# Patient Record
Sex: MALE | Race: WHITE | NOT HISPANIC OR LATINO | Employment: FULL TIME | URBAN - METROPOLITAN AREA
[De-identification: names, ages, dates, MRNs, and addresses within clinical notes are randomized per-mention and may not be internally consistent; named-entity substitution may affect disease eponyms.]

---
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
## 2024-03-07 RX ORDER — FAMOTIDINE 20 MG/1
20 TABLET, FILM COATED ORAL 2 TIMES DAILY PRN
COMMUNITY

## 2024-03-07 RX ORDER — TRIAMCINOLONE ACETONIDE 1 MG/G
CREAM TOPICAL 2 TIMES DAILY PRN
COMMUNITY

## 2024-03-07 RX ORDER — BUSPIRONE HYDROCHLORIDE 5 MG/1
5 TABLET ORAL DAILY
COMMUNITY

## 2024-03-07 RX ORDER — FLUOCINONIDE TOPICAL SOLUTION USP, 0.05% 0.5 MG/ML
SOLUTION TOPICAL 2 TIMES DAILY PRN
COMMUNITY

## 2024-03-07 RX ORDER — DESONIDE 0.5 MG/G
CREAM TOPICAL 2 TIMES DAILY PRN
COMMUNITY

## 2024-03-07 NOTE — PROGRESS NOTES
PTA medications updated by Medication Scribe prior to surgery via phone call with patient (last doses completed by Nurse)     Medication history sources: Patient and H&P  In the past week, patient estimated taking medication this percent of the time: Greater than 90%      Significant changes made to the medication list:  None      Additional medication history information:   None    Medication reconciliation completed by provider prior to medication history? No    Time spent in this activity: 30 minutes    The information provided in this note is only as accurate as the sources available at the time of update(s)      Prior to Admission medications    Medication Sig Last Dose Taking? Auth Provider Long Term End Date   busPIRone (BUSPAR) 5 MG tablet Take 5 mg by mouth daily as needed 03/05/2024 at am Yes Reported, Patient Yes    desonide (DESOWEN) 0.05 % external cream Apply topically 2 times daily as needed More than a month Yes Reported, Patient     famotidine (PEPCID) 20 MG tablet Take 20 mg by mouth 2 times daily as needed 03/05/2024 at am Yes Reported, Patient     fluocinonide (LIDEX) 0.05 % external solution Apply topically 2 times daily as needed More than a month Yes Reported, Patient     sodium chloride (OCEAN) 0.65 % nasal spray Spray 1 spray into both nostrils daily as needed for congestion prn Yes Reported, Patient     triamcinolone (KENALOG) 0.1 % external cream Apply topically 2 times daily as needed for irritation   Reported, Patient         Medication history completed by: Caridad Monsalve

## 2024-03-08 ENCOUNTER — HOSPITAL ENCOUNTER (INPATIENT)
Facility: CLINIC | Age: 24
LOS: 1 days | Discharge: HOME | DRG: 142 | End: 2024-03-09
Attending: DENTIST | Admitting: DENTIST
Payer: COMMERCIAL

## 2024-03-08 ENCOUNTER — ANESTHESIA EVENT (OUTPATIENT)
Dept: SURGERY | Facility: CLINIC | Age: 24
DRG: 142 | End: 2024-03-08
Payer: COMMERCIAL

## 2024-03-08 ENCOUNTER — ANESTHESIA (OUTPATIENT)
Dept: SURGERY | Facility: CLINIC | Age: 24
DRG: 142 | End: 2024-03-08
Payer: COMMERCIAL

## 2024-03-08 PROBLEM — M26.04 MANDIBULAR HYPOPLASIA: Status: ACTIVE | Noted: 2024-03-08

## 2024-03-08 PROCEDURE — 250N000009 HC RX 250: Performed by: NURSE ANESTHETIST, CERTIFIED REGISTERED

## 2024-03-08 PROCEDURE — 258N000003 HC RX IP 258 OP 636: Performed by: ANESTHESIOLOGY

## 2024-03-08 PROCEDURE — 250N000011 HC RX IP 250 OP 636: Performed by: NURSE ANESTHETIST, CERTIFIED REGISTERED

## 2024-03-08 PROCEDURE — 258N000003 HC RX IP 258 OP 636: Performed by: NURSE ANESTHETIST, CERTIFIED REGISTERED

## 2024-03-08 PROCEDURE — 272N000001 HC OR GENERAL SUPPLY STERILE: Performed by: DENTIST

## 2024-03-08 PROCEDURE — 250N000011 HC RX IP 250 OP 636: Performed by: DENTIST

## 2024-03-08 PROCEDURE — 258N000003 HC RX IP 258 OP 636: Performed by: DENTIST

## 2024-03-08 PROCEDURE — 250N000013 HC RX MED GY IP 250 OP 250 PS 637: Performed by: DENTIST

## 2024-03-08 PROCEDURE — 250N000025 HC SEVOFLURANE, PER MIN: Performed by: DENTIST

## 2024-03-08 PROCEDURE — 999N000141 HC STATISTIC PRE-PROCEDURE NURSING ASSESSMENT: Performed by: DENTIST

## 2024-03-08 PROCEDURE — 710N000009 HC RECOVERY PHASE 1, LEVEL 1, PER MIN: Performed by: DENTIST

## 2024-03-08 PROCEDURE — 0NST0ZZ REPOSITION RIGHT MANDIBLE, OPEN APPROACH: ICD-10-PCS | Performed by: DENTIST

## 2024-03-08 PROCEDURE — 21195 RECONST LWR JAW W/O FIXATION: CPT | Performed by: NURSE ANESTHETIST, CERTIFIED REGISTERED

## 2024-03-08 PROCEDURE — 0NSV04Z REPOSITION LEFT MANDIBLE WITH INTERNAL FIXATION DEVICE, OPEN APPROACH: ICD-10-PCS | Performed by: DENTIST

## 2024-03-08 PROCEDURE — 360N000077 HC SURGERY LEVEL 4, PER MIN: Performed by: DENTIST

## 2024-03-08 PROCEDURE — 21195 RECONST LWR JAW W/O FIXATION: CPT | Performed by: ANESTHESIOLOGY

## 2024-03-08 PROCEDURE — 120N000001 HC R&B MED SURG/OB

## 2024-03-08 PROCEDURE — 370N000017 HC ANESTHESIA TECHNICAL FEE, PER MIN: Performed by: DENTIST

## 2024-03-08 PROCEDURE — 272N000002 HC OR SUPPLY OTHER OPNP: Performed by: DENTIST

## 2024-03-08 PROCEDURE — 250N000009 HC RX 250: Performed by: DENTIST

## 2024-03-08 PROCEDURE — C1713 ANCHOR/SCREW BN/BN,TIS/BN: HCPCS | Performed by: DENTIST

## 2024-03-08 DEVICE — WIRE SURGICAL STEEL 24GA 2 DS-24: Type: IMPLANTABLE DEVICE | Site: MANDIBLE | Status: FUNCTIONAL

## 2024-03-08 DEVICE — WIRE SURGICAL STEEL 26GA 0 DS-26: Type: IMPLANTABLE DEVICE | Site: MANDIBLE | Status: FUNCTIONAL

## 2024-03-08 DEVICE — IMP SCR STRK 2.0X12MM 5020412: Type: IMPLANTABLE DEVICE | Site: MANDIBLE | Status: FUNCTIONAL

## 2024-03-08 DEVICE — IMPLANTABLE DEVICE: Type: IMPLANTABLE DEVICE | Site: MANDIBLE | Status: FUNCTIONAL

## 2024-03-08 DEVICE — IMP PLATE STRK STR 04H BAR LOCKING 06MM 5510564: Type: IMPLANTABLE DEVICE | Site: MANDIBLE | Status: FUNCTIONAL

## 2024-03-08 DEVICE — IMP SCR STRK 2.0X04MM 5020404: Type: IMPLANTABLE DEVICE | Site: MANDIBLE | Status: FUNCTIONAL

## 2024-03-08 RX ORDER — SODIUM CHLORIDE, SODIUM LACTATE, POTASSIUM CHLORIDE, CALCIUM CHLORIDE 600; 310; 30; 20 MG/100ML; MG/100ML; MG/100ML; MG/100ML
INJECTION, SOLUTION INTRAVENOUS CONTINUOUS
Status: DISCONTINUED | OUTPATIENT
Start: 2024-03-08 | End: 2024-03-08 | Stop reason: HOSPADM

## 2024-03-08 RX ORDER — ONDANSETRON 4 MG/1
4 TABLET, ORALLY DISINTEGRATING ORAL EVERY 30 MIN PRN
Status: DISCONTINUED | OUTPATIENT
Start: 2024-03-08 | End: 2024-03-08 | Stop reason: HOSPADM

## 2024-03-08 RX ORDER — LIDOCAINE HYDROCHLORIDE 20 MG/ML
INJECTION, SOLUTION INFILTRATION; PERINEURAL PRN
Status: DISCONTINUED | OUTPATIENT
Start: 2024-03-08 | End: 2024-03-08

## 2024-03-08 RX ORDER — MEPERIDINE HYDROCHLORIDE 25 MG/ML
12.5 INJECTION INTRAMUSCULAR; INTRAVENOUS; SUBCUTANEOUS EVERY 5 MIN PRN
Status: DISCONTINUED | OUTPATIENT
Start: 2024-03-08 | End: 2024-03-08 | Stop reason: HOSPADM

## 2024-03-08 RX ORDER — ACETAMINOPHEN 325 MG/1
975 TABLET ORAL EVERY 8 HOURS
Qty: 27 TABLET | Refills: 0 | Status: DISCONTINUED | OUTPATIENT
Start: 2024-03-08 | End: 2024-03-09 | Stop reason: HOSPADM

## 2024-03-08 RX ORDER — DEXAMETHASONE SODIUM PHOSPHATE 10 MG/ML
6 INJECTION, SOLUTION INTRAMUSCULAR; INTRAVENOUS EVERY 6 HOURS
Qty: 3 ML | Refills: 0 | Status: COMPLETED | OUTPATIENT
Start: 2024-03-08 | End: 2024-03-09

## 2024-03-08 RX ORDER — BENZOCAINE/MENTHOL 6 MG-10 MG
LOZENGE MUCOUS MEMBRANE PRN
Status: DISCONTINUED | OUTPATIENT
Start: 2024-03-08 | End: 2024-03-08 | Stop reason: HOSPADM

## 2024-03-08 RX ORDER — DEXMEDETOMIDINE HYDROCHLORIDE 4 UG/ML
INJECTION, SOLUTION INTRAVENOUS CONTINUOUS PRN
Status: DISCONTINUED | OUTPATIENT
Start: 2024-03-08 | End: 2024-03-08

## 2024-03-08 RX ORDER — FENTANYL CITRATE 50 UG/ML
25 INJECTION, SOLUTION INTRAMUSCULAR; INTRAVENOUS EVERY 5 MIN PRN
Status: DISCONTINUED | OUTPATIENT
Start: 2024-03-08 | End: 2024-03-08 | Stop reason: HOSPADM

## 2024-03-08 RX ORDER — PROPOFOL 10 MG/ML
INJECTION, EMULSION INTRAVENOUS PRN
Status: DISCONTINUED | OUTPATIENT
Start: 2024-03-08 | End: 2024-03-08

## 2024-03-08 RX ORDER — OXYCODONE HYDROCHLORIDE 5 MG/1
10 TABLET ORAL EVERY 4 HOURS PRN
Status: DISCONTINUED | OUTPATIENT
Start: 2024-03-08 | End: 2024-03-09 | Stop reason: HOSPADM

## 2024-03-08 RX ORDER — AMPICILLIN AND SULBACTAM 1; .5 G/1; G/1
1.5 INJECTION, POWDER, FOR SOLUTION INTRAMUSCULAR; INTRAVENOUS EVERY 6 HOURS
Status: DISCONTINUED | OUTPATIENT
Start: 2024-03-08 | End: 2024-03-09 | Stop reason: HOSPADM

## 2024-03-08 RX ORDER — SODIUM CHLORIDE 9 MG/ML
INJECTION, SOLUTION INTRAVENOUS CONTINUOUS
Status: DISCONTINUED | OUTPATIENT
Start: 2024-03-08 | End: 2024-03-09 | Stop reason: HOSPADM

## 2024-03-08 RX ORDER — ONDANSETRON 2 MG/ML
INJECTION INTRAMUSCULAR; INTRAVENOUS PRN
Status: DISCONTINUED | OUTPATIENT
Start: 2024-03-08 | End: 2024-03-08

## 2024-03-08 RX ORDER — HYDROMORPHONE HCL IN WATER/PF 6 MG/30 ML
0.2 PATIENT CONTROLLED ANALGESIA SYRINGE INTRAVENOUS EVERY 4 HOURS PRN
Status: DISCONTINUED | OUTPATIENT
Start: 2024-03-08 | End: 2024-03-09 | Stop reason: HOSPADM

## 2024-03-08 RX ORDER — PETROLATUM,WHITE
OINTMENT IN PACKET (GRAM) TOPICAL
Status: DISCONTINUED | OUTPATIENT
Start: 2024-03-08 | End: 2024-03-09 | Stop reason: HOSPADM

## 2024-03-08 RX ORDER — BISACODYL 10 MG
10 SUPPOSITORY, RECTAL RECTAL DAILY PRN
Status: DISCONTINUED | OUTPATIENT
Start: 2024-03-11 | End: 2024-03-09 | Stop reason: HOSPADM

## 2024-03-08 RX ORDER — AMOXICILLIN 250 MG
1 CAPSULE ORAL 2 TIMES DAILY
Status: DISCONTINUED | OUTPATIENT
Start: 2024-03-08 | End: 2024-03-09 | Stop reason: HOSPADM

## 2024-03-08 RX ORDER — HYDROMORPHONE HCL IN WATER/PF 6 MG/30 ML
0.4 PATIENT CONTROLLED ANALGESIA SYRINGE INTRAVENOUS
Status: DISCONTINUED | OUTPATIENT
Start: 2024-03-08 | End: 2024-03-09 | Stop reason: HOSPADM

## 2024-03-08 RX ORDER — LIDOCAINE 40 MG/G
CREAM TOPICAL
Status: DISCONTINUED | OUTPATIENT
Start: 2024-03-08 | End: 2024-03-09 | Stop reason: HOSPADM

## 2024-03-08 RX ORDER — HYDROMORPHONE HYDROCHLORIDE 1 MG/ML
INJECTION, SOLUTION INTRAMUSCULAR; INTRAVENOUS; SUBCUTANEOUS PRN
Status: DISCONTINUED | OUTPATIENT
Start: 2024-03-08 | End: 2024-03-08

## 2024-03-08 RX ORDER — HYDROMORPHONE HCL IN WATER/PF 6 MG/30 ML
0.2 PATIENT CONTROLLED ANALGESIA SYRINGE INTRAVENOUS EVERY 5 MIN PRN
Status: DISCONTINUED | OUTPATIENT
Start: 2024-03-08 | End: 2024-03-08 | Stop reason: HOSPADM

## 2024-03-08 RX ORDER — FENTANYL CITRATE 50 UG/ML
INJECTION, SOLUTION INTRAMUSCULAR; INTRAVENOUS PRN
Status: DISCONTINUED | OUTPATIENT
Start: 2024-03-08 | End: 2024-03-08

## 2024-03-08 RX ORDER — POLYETHYLENE GLYCOL 3350 17 G/17G
17 POWDER, FOR SOLUTION ORAL DAILY
Status: DISCONTINUED | OUTPATIENT
Start: 2024-03-09 | End: 2024-03-09 | Stop reason: HOSPADM

## 2024-03-08 RX ORDER — ONDANSETRON 2 MG/ML
4 INJECTION INTRAMUSCULAR; INTRAVENOUS EVERY 6 HOURS PRN
Status: DISCONTINUED | OUTPATIENT
Start: 2024-03-08 | End: 2024-03-09 | Stop reason: HOSPADM

## 2024-03-08 RX ORDER — ACETAMINOPHEN 325 MG/1
650 TABLET ORAL EVERY 4 HOURS PRN
Status: DISCONTINUED | OUTPATIENT
Start: 2024-03-11 | End: 2024-03-09 | Stop reason: HOSPADM

## 2024-03-08 RX ORDER — VECURONIUM BROMIDE 1 MG/ML
INJECTION, POWDER, LYOPHILIZED, FOR SOLUTION INTRAVENOUS PRN
Status: DISCONTINUED | OUTPATIENT
Start: 2024-03-08 | End: 2024-03-08

## 2024-03-08 RX ORDER — ONDANSETRON 4 MG/1
4 TABLET, ORALLY DISINTEGRATING ORAL EVERY 6 HOURS PRN
Status: DISCONTINUED | OUTPATIENT
Start: 2024-03-08 | End: 2024-03-09 | Stop reason: HOSPADM

## 2024-03-08 RX ORDER — NALOXONE HYDROCHLORIDE 0.4 MG/ML
0.4 INJECTION, SOLUTION INTRAMUSCULAR; INTRAVENOUS; SUBCUTANEOUS
Status: DISCONTINUED | OUTPATIENT
Start: 2024-03-08 | End: 2024-03-09 | Stop reason: HOSPADM

## 2024-03-08 RX ORDER — PROPOFOL 10 MG/ML
INJECTION, EMULSION INTRAVENOUS CONTINUOUS PRN
Status: DISCONTINUED | OUTPATIENT
Start: 2024-03-08 | End: 2024-03-08

## 2024-03-08 RX ORDER — CEFAZOLIN SODIUM/WATER 2 G/20 ML
2 SYRINGE (ML) INTRAVENOUS SEE ADMIN INSTRUCTIONS
Status: DISCONTINUED | OUTPATIENT
Start: 2024-03-08 | End: 2024-03-08 | Stop reason: HOSPADM

## 2024-03-08 RX ORDER — NALOXONE HYDROCHLORIDE 0.4 MG/ML
0.2 INJECTION, SOLUTION INTRAMUSCULAR; INTRAVENOUS; SUBCUTANEOUS
Status: DISCONTINUED | OUTPATIENT
Start: 2024-03-08 | End: 2024-03-09 | Stop reason: HOSPADM

## 2024-03-08 RX ORDER — CHLORHEXIDINE GLUCONATE ORAL RINSE 1.2 MG/ML
10 SOLUTION DENTAL ONCE
Status: COMPLETED | OUTPATIENT
Start: 2024-03-08 | End: 2024-03-08

## 2024-03-08 RX ORDER — FENTANYL CITRATE 50 UG/ML
50 INJECTION, SOLUTION INTRAMUSCULAR; INTRAVENOUS EVERY 5 MIN PRN
Status: DISCONTINUED | OUTPATIENT
Start: 2024-03-08 | End: 2024-03-08 | Stop reason: HOSPADM

## 2024-03-08 RX ORDER — BENZOCAINE/MENTHOL 6 MG-10 MG
LOZENGE MUCOUS MEMBRANE 3 TIMES DAILY
Status: DISCONTINUED | OUTPATIENT
Start: 2024-03-08 | End: 2024-03-09 | Stop reason: HOSPADM

## 2024-03-08 RX ORDER — NALOXONE HYDROCHLORIDE 0.4 MG/ML
0.1 INJECTION, SOLUTION INTRAMUSCULAR; INTRAVENOUS; SUBCUTANEOUS
Status: DISCONTINUED | OUTPATIENT
Start: 2024-03-08 | End: 2024-03-08 | Stop reason: HOSPADM

## 2024-03-08 RX ORDER — PROCHLORPERAZINE MALEATE 10 MG
10 TABLET ORAL EVERY 6 HOURS PRN
Status: DISCONTINUED | OUTPATIENT
Start: 2024-03-08 | End: 2024-03-09 | Stop reason: HOSPADM

## 2024-03-08 RX ORDER — METHYLPREDNISOLONE SODIUM SUCCINATE 125 MG/2ML
125 INJECTION, POWDER, LYOPHILIZED, FOR SOLUTION INTRAMUSCULAR; INTRAVENOUS ONCE
Status: COMPLETED | OUTPATIENT
Start: 2024-03-08 | End: 2024-03-08

## 2024-03-08 RX ORDER — OXYCODONE HYDROCHLORIDE 5 MG/1
5 TABLET ORAL EVERY 4 HOURS PRN
Status: DISCONTINUED | OUTPATIENT
Start: 2024-03-08 | End: 2024-03-09 | Stop reason: HOSPADM

## 2024-03-08 RX ORDER — ONDANSETRON 2 MG/ML
4 INJECTION INTRAMUSCULAR; INTRAVENOUS EVERY 30 MIN PRN
Status: DISCONTINUED | OUTPATIENT
Start: 2024-03-08 | End: 2024-03-08 | Stop reason: HOSPADM

## 2024-03-08 RX ORDER — HYDROMORPHONE HCL IN WATER/PF 6 MG/30 ML
0.4 PATIENT CONTROLLED ANALGESIA SYRINGE INTRAVENOUS EVERY 5 MIN PRN
Status: DISCONTINUED | OUTPATIENT
Start: 2024-03-08 | End: 2024-03-08 | Stop reason: HOSPADM

## 2024-03-08 RX ORDER — CEFAZOLIN SODIUM/WATER 2 G/20 ML
2 SYRINGE (ML) INTRAVENOUS
Status: COMPLETED | OUTPATIENT
Start: 2024-03-08 | End: 2024-03-08

## 2024-03-08 RX ADMIN — VECURONIUM BROMIDE 5 MG: 1 INJECTION, POWDER, LYOPHILIZED, FOR SOLUTION INTRAVENOUS at 12:36

## 2024-03-08 RX ADMIN — PHENYLEPHRINE HYDROCHLORIDE 100 MCG: 10 INJECTION INTRAVENOUS at 14:52

## 2024-03-08 RX ADMIN — AMPICILLIN SODIUM AND SULBACTAM SODIUM 1.5 G: 1; .5 INJECTION, POWDER, FOR SOLUTION INTRAMUSCULAR; INTRAVENOUS at 22:24

## 2024-03-08 RX ADMIN — ONDANSETRON 4 MG: 2 INJECTION INTRAMUSCULAR; INTRAVENOUS at 14:35

## 2024-03-08 RX ADMIN — PHENYLEPHRINE HYDROCHLORIDE 100 MCG: 10 INJECTION INTRAVENOUS at 14:33

## 2024-03-08 RX ADMIN — PHENYLEPHRINE HYDROCHLORIDE 50 MCG: 10 INJECTION INTRAVENOUS at 14:19

## 2024-03-08 RX ADMIN — ROCURONIUM BROMIDE 30 MG: 50 INJECTION, SOLUTION INTRAVENOUS at 12:30

## 2024-03-08 RX ADMIN — HYDROMORPHONE HYDROCHLORIDE 0.4 MG: 0.2 INJECTION, SOLUTION INTRAMUSCULAR; INTRAVENOUS; SUBCUTANEOUS at 18:22

## 2024-03-08 RX ADMIN — PROPOFOL 200 MG: 10 INJECTION, EMULSION INTRAVENOUS at 12:24

## 2024-03-08 RX ADMIN — HYDROMORPHONE HYDROCHLORIDE 0.2 MG: 0.2 INJECTION, SOLUTION INTRAMUSCULAR; INTRAVENOUS; SUBCUTANEOUS at 20:39

## 2024-03-08 RX ADMIN — OXYCODONE HYDROCHLORIDE 10 MG: 5 TABLET ORAL at 18:25

## 2024-03-08 RX ADMIN — DEXMEDETOMIDINE HYDROCHLORIDE 0.2 MCG/KG/HR: 200 INJECTION INTRAVENOUS at 12:59

## 2024-03-08 RX ADMIN — SODIUM CHLORIDE 1000 ML: 9 INJECTION, SOLUTION INTRAVENOUS at 16:30

## 2024-03-08 RX ADMIN — PROPOFOL 150 MCG/KG/MIN: 10 INJECTION, EMULSION INTRAVENOUS at 12:26

## 2024-03-08 RX ADMIN — ONDANSETRON 4 MG: 2 INJECTION INTRAMUSCULAR; INTRAVENOUS at 21:57

## 2024-03-08 RX ADMIN — SODIUM CHLORIDE, POTASSIUM CHLORIDE, SODIUM LACTATE AND CALCIUM CHLORIDE: 600; 310; 30; 20 INJECTION, SOLUTION INTRAVENOUS at 13:48

## 2024-03-08 RX ADMIN — SUGAMMADEX 200 MG: 100 INJECTION, SOLUTION INTRAVENOUS at 14:52

## 2024-03-08 RX ADMIN — CHLORHEXIDINE GLUCONATE 0.12% ORAL RINSE 10 ML: 1.2 LIQUID ORAL at 10:41

## 2024-03-08 RX ADMIN — FENTANYL CITRATE 100 MCG: 50 INJECTION INTRAMUSCULAR; INTRAVENOUS at 12:17

## 2024-03-08 RX ADMIN — ACETAMINOPHEN 975 MG: 325 TABLET, FILM COATED ORAL at 21:56

## 2024-03-08 RX ADMIN — HYDROMORPHONE HYDROCHLORIDE 0.5 MG: 1 INJECTION, SOLUTION INTRAMUSCULAR; INTRAVENOUS; SUBCUTANEOUS at 12:55

## 2024-03-08 RX ADMIN — AMPICILLIN SODIUM AND SULBACTAM SODIUM 1.5 G: 1; .5 INJECTION, POWDER, FOR SOLUTION INTRAMUSCULAR; INTRAVENOUS at 19:51

## 2024-03-08 RX ADMIN — Medication 2 G: at 12:28

## 2024-03-08 RX ADMIN — DEXAMETHASONE SODIUM PHOSPHATE 6 MG: 10 INJECTION INTRAMUSCULAR; INTRAVENOUS at 20:07

## 2024-03-08 RX ADMIN — SODIUM CHLORIDE, POTASSIUM CHLORIDE, SODIUM LACTATE AND CALCIUM CHLORIDE: 600; 310; 30; 20 INJECTION, SOLUTION INTRAVENOUS at 10:40

## 2024-03-08 RX ADMIN — DEXMEDETOMIDINE HYDROCHLORIDE 20 MCG: 200 INJECTION INTRAVENOUS at 12:40

## 2024-03-08 RX ADMIN — METHYLPREDNISOLONE SODIUM SUCCINATE 125 MG: 125 INJECTION INTRAMUSCULAR; INTRAVENOUS at 12:24

## 2024-03-08 RX ADMIN — ROCURONIUM BROMIDE 50 MG: 50 INJECTION, SOLUTION INTRAVENOUS at 12:25

## 2024-03-08 RX ADMIN — MIDAZOLAM 2 MG: 1 INJECTION INTRAMUSCULAR; INTRAVENOUS at 12:17

## 2024-03-08 RX ADMIN — LIDOCAINE HYDROCHLORIDE 100 MG: 20 INJECTION, SOLUTION INFILTRATION; PERINEURAL at 12:24

## 2024-03-08 ASSESSMENT — ACTIVITIES OF DAILY LIVING (ADL)
ADLS_ACUITY_SCORE: 23
ADLS_ACUITY_SCORE: 23
ADLS_ACUITY_SCORE: 18
ADLS_ACUITY_SCORE: 27
ADLS_ACUITY_SCORE: 27
ADLS_ACUITY_SCORE: 23
ADLS_ACUITY_SCORE: 18
ADLS_ACUITY_SCORE: 23
ADLS_ACUITY_SCORE: 33
ADLS_ACUITY_SCORE: 18
ADLS_ACUITY_SCORE: 20
ADLS_ACUITY_SCORE: 18
ADLS_ACUITY_SCORE: 18
ADLS_ACUITY_SCORE: 35

## 2024-03-08 NOTE — OP NOTE
Oral and maxillofacial Operative Report:    Pre-Operative Diagnosis: Mandibular Hypoplasia    Post-Operative Diagnosis: same    Procedure Performed: Saggital Split Osteotomy    Surgeons: Sarina/Kolby    EBL: 50cc*    Fluids: crystalloid    Local anesthetic: lidocaine    Drains: none    Specimens: none    Complications: none*    Implants: see op note.      INDICATIONS FOR PROCEDURE:  Patient  is an otherwise healthy 23-year-old male who was referred to our clinic for evaluation and consultation regarding mandibular hypoplasia. The patient has been continued to be followed closely by our service.  The patient was then deemed ready from a surgical standpoint and from a orthodontic standpoint, for the above-mentioned procedure.  After the review of the patient as well as clinical images, radiographs, and presurgical models, the patient was deemed ready for the procedure.  The risks and benefits of the procedure were thoroughly discussed with the patient as well as the patient's parents.  These risks included but not limited to bleeding, bruising, pain, swelling, infection, fibrous union, nonunion, malunion, malocclusion, persistent malocclusion, failure of hardware, infection of hardware, need for removal of hardware, nerve injury to bilateral mandibular inferior alveolar nerve, bilateral lingual nerve resulting in temporary or permanent paresthesia, dysesthesia, or anaesthesia, and need for additional procedures in the future.  After the full risks and benefits of the procedure were discussed, the patient thoroughly understood and the patient consented to the procedure.      DESCRIPTION OF PROCEDURE:  On the day of the procedure, the patient was met in the preoperative holding area and written and verbal consent was obtained.  The preoperative surgical site was marked according to surgical protocol.  The patient was then escorted by the Anesthesia Service to operating room 32, was placed in supine position.  All  standard ASA monitors were applied.  The patient was then appropriately padded.  The patient was then intubated in the left naris without any difficulty.  Confirmation of the tube was confirmed via the Anesthesia Service via auscultation of the lungs, CO2 monitoring, as well as presence of fog in the tube.  The tube was then positioned over the top of the head and fixated so as to not place any pressure on the left naris.  A nasogastric tube was then placed in the right naris with no pressure on the right naris into the stomach to prevent excessive blood accumulation in the stomach.  The patient was then prepped and draped to create a sterile field around the oral cavity.  The oral surgeons then left the operating room and left the patient under the care of the Anesthesia Service to perform a sterile scrub.  The oral surgeons then returned to the operating room to don sterile gowns and gloves.  A preoperative surgical timeout was conducted. Local anesthesia was given via bilateral inferior alveolar nerve blocks, long buccal nerve blocks and local infiltration.  A throat screen was then placed to protect the posterior oropharynx and a bite block was placed.      Attention was first addressed to the left mandibular sagittal split.  A cautery was used to create a vertical sagittal split soft tissue incision on the lateral ascending ramus into the left mandibular vestibule just anterior to tooth #19.  A buccal subperiosteal dissection was completed on the lateral border of the mandible.  Then meticulously the lingual aspect of the mandibular body was exposed subperiosteally until the lingula was identified.  A nerve hook was then used to identify the mandibular foramen and isolate the superior aspect of the inferior alveolar nerve.  After the nerve was identified, a channel retractor was then placed on the lingual aspect subperiosteally over the superior aspect of the inferior alveolar nerve so as to protect it.  A  surgical handpiece, under copious irrigation with a barrel bur, was used to create the initial horizontal lingual horizontal osteotomy approximating the lingula.  Once sufficient bone for initial osteotomy was created, this osteotomy was then extended with a Eamon bur under copious irrigation to finalize the horizontal cut.  Then a sagittal saw was then used to create the superior cut along the superior aspect of the ramus and mandibular body along the lateral of tooth #18 and to the distal aspect of tooth #19 under copious irrigation.  A vertical osteotomy was created on the buccal aspect of tooth #19 with a Mita bur through the cortical bone and through the inferior border of the mandible under copious irrigaiton.  Then, with a combination of a narrow and wide osteotome and mallallen, Gibbs , and Gibbs separator the inferior border of the osteotomy was engaged anteriorly and inferiorly and along the crestal aspect of the osteotomy was engaged and slowly and meticulously the sagittal split osteotomy was completed.  The inferior alveolar nerve was visualized and noted to be intact.  The distal portion of the nerve was embedded in the proximal segment medullary bone.  The overlying bone was then removed and freed up the nerve so as to be within the distal segment freely and passively.    Attention was addressed to the right mandibular sagittal split.  A cautery was used to create a vertical sagittal split soft tissue incision on the lateral ascending ramus into the left mandibular vestibule just anterior to tooth #30.  A buccal subperiosteal dissection was completed on the lateral border of the mandible.  Then meticulously the lingual aspect of the mandibular body was exposed subperiosteally until the lingula was identified.  A nerve hook was then used to identify the mandibular foramen and isolate the superior aspect of the inferior alveolar nerve.  After the nerve was identified, a channel retractor  was then placed on the lingual aspect subperiosteally over the superior aspect of the inferior alveolar nerve so as to protect it.  A surgical handpiece, under copious irrigation with a barrel bur, was used to create the initial horizontal lingual horizontal osteotomy approximating the lingula.  Once sufficient bone for initial osteotomy was created, this osteotomy was then extended with a Eamon bur under copious irrigation to finalize the horizontal cut.  Then a sagittal saw was then used to create the superior cut along the superior aspect of the ramus and mandibular body along the lateral of tooth #31 and to the distal aspect of tooth #30 under copious irrigation.  A vertical osteotomy was created on the buccal aspect of tooth #30 with a Mita bur through the cortical bone and through the inferior border of the mandible under copious irrigaiton.  Then, with a combination of a narrow and wide osteotome and mallallen, Gibbs , and Gibbs separator the inferior border of the osteotomy was engaged anteriorly and inferiorly and along the crestal aspect of the osteotomy was engaged and slowly and meticulously the sagittal split osteotomy was completed.  The inferior alveolar nerve was visualized and noted to be intact.  The distal portion of the nerve was embedded in the proximal segment medullary bone.  The overlying bone was then removed however a small portion of the nerve remained heavily within the proximal segment of medullary bone. Given the rightward rotation of the mandibular, rather than risk damaging the nerve by removing the overlying bone, the nerve was left in place in the proximal segment.  The nerve did appear impinged or stretched.    Both the right and the left segments were thoroughly irrigated and evaluated.  No sharp bony undercuts were noted.  The occlusal splint was then inserted and noted to have the maxillary and mandibular teeth fully interdigitate passively into the predetermined  presurgical occlusion.  The patient was then placed into maxillomandibular wire fixation with the interocclusal splint in place.  Attention was then addressed to fixating the osteotomized segments.  A 4-hole yordan fixation plate was then adapted over the superior lateral border of the left mandibular segments and the 4 holes were then initially created under copious irrigation and a combination of 4 mm screws were placed.  The plate was noted to be rigidly fixated with all four screws fully engaged within the bone without any stripping.  Next, a 4-hole yordan fixation plate was then adapted over the superior lateral border of the right mandibular segments and the 4 holes were then initially created under copious irrigation and then 4 mm screws were placed.  The plate was noted to be rigidly fixated with all four screws fully engaged within the bone without any stripping.   Next a round bur was used and a drill to create two single individual osteotomies for single 12 mm lag screws bilaterally. Two individual 12 mm screws were then seating with lag screw technique and fully tightened without any stripping of the screws. Both segments were noted to be well adapted with the superior segments and well aligned.  The condylar position appeared to be unchanged.  The wire fixation was then removed.  The mandible movement was noted to be smooth and passive, noting that the patient were to be able to easily interdigitate into the splint without any mobility, sliding, or premature irregular occlusion.  The splint was then removed.  The occlusion was then assessed.  The occlusion was noted to be stable.  Maxillary and mandibular midlines were stable and coincident.  Class I molar occlusion noted. The osteotomized segments were noted to be rigid with no mobility.  The vestibular incisions were then closed with 3-0 Vicryl suture in a combination of a running nonlocking fashion as well as interrupted fashion.  Both vestibular  incisions were noted to be hemostatic.  No persistent bleeding.  The previously placed throat pack was then removed.  The oropharynx was then suctioned.  Again, the occlusion was assessed additionally one final time and noted to be stable.  The procedure was then completed at this time.  No complications.  The patient was then extubated.  All counts were correct x 2.  The patient was then turned over to the Anesthesia Service for a smooth wakeup and transferred to PACU.     Robert Jarrett DDS

## 2024-03-08 NOTE — ANESTHESIA CARE TRANSFER NOTE
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    Anesthesia Type:   General     Note:    Oropharynx: oropharynx clear of all foreign objects and spontaneously breathing  Level of Consciousness: awake      Independent Airway: airway patency satisfactory and stable  Dentition: dentition unchanged  Vital Signs Stable: post-procedure vital signs reviewed and stable  Report to RN Given: handoff report given  Patient transferred to: PACU  Comments: Neuromuscular blockade reversed with sugammadex, spontaneous respirations, adequate tidal volumes, followed commands to voice, oropharynx suctioned with soft flexible catheter, extubated atraumatically, extubated with suction, airway patent after extubation.  Oxygen via facemask at 6 liters per minute to PACU. Oxygen tubing connected to wall O2 in PACU, SpO2, NiBP, and EKG monitors and alarms on and functioning, Dorothea Hugger warmer connected to patient gown, report on patient's clinical status given to PACU RN, RN questions answered.         Handoff Report: Identifed the Patient, Identified the Reponsible Provider, Reviewed the pertinent medical history, Discussed the surgical course, Reviewed Intra-OP anesthesia mangement and issues during anesthesia, Set expectations for post-procedure period and Allowed opportunity for questions and acknowledgement of understanding        Electronically Signed By: TANNA Watts CRNA  March 8, 2024  3:21 PM

## 2024-03-08 NOTE — BRIEF OP NOTE
[facility]    Brief Operative Note    Pre-operative diagnosis: Mandibular hypoplasia [M26.04]  Post-operative diagnosis Same as pre-operative diagnosis    Procedure: Bilateral Sagittal Split Osteotomy, Bilateral - Jaw    Surgeon: Surgeon(s) and Role:     * Feliberto Branch DDS - Primary     * Robert Jarrett DDS  Anesthesia: General   Estimated Blood Loss: 50cc    Drains: None  Specimens: * No specimens in log *  Findings:   Rigid fixation of mandible achieved .  Complications: None.  Implants:   Implant Name Type Inv. Item Serial No.  Lot No. LRB No. Used Action   WIRE SURGICAL STEEL 24GA 2 DS-24 - [id number] Wire WIRE SURGICAL STEEL 24GA 2 DS-24  [employer] 42   11   [id number] Right 1 Used as a Supply   WIRE SURGICAL STEEL 26GA 0 DS-26 - [id number] Wire WIRE SURGICAL STEEL 26GA 0 DS-26  [employer] 42   03   [id number]   [id number] Right 1 Used as a Supply   IMP PLATE STRK STR 04H BAR LOCKING 06MM [id number] - [id number] Metallic Hardware/[place] IMP PLATE STRK STR 04H BAR LOCKING 06MM [id number]  [employer] 42   02 02/29/24 Right 1 Implanted   IMP PLATE STRK STR 06H BAR LOCKING 12MM [id number] - [id number] Metallic Hardware/[place] IMP PLATE STRK STR 06H BAR LOCKING 12MM [id number]  [employer] 42 02 02/29/24 Left 1 Implanted   IMP SCR STRK 2.0X04MM [id number] - [id number] Metallic Hardware/[place] IMP SCR STRK 2.0X04MM [id number]  [employer] 42 02 02/29/24 Left 4 Implanted   IMP SCR STRK 2.0X04MM [id number] - [id number] Metallic Hardware/[place] IMP SCR STRK 2.0X04MM [id number]  [employer] 42 02 02/29/24 Right 4 Implanted   IMP SCR STRK 2.0X12MM [id number] - [id number] Metallic Hardware/[place] IMP SCR STRK 2.0X12MM [id number]  [employer] 42 02 02/29/24 Left 1 Implanted   IMP SCR STRK 2.0X12MM [id number] - [id number] Metallic Hardware/[place] IMP SCR STRK 2.0X12MM [id number]  [employer] 42 02 02/29/24 Right 1 Implanted   IMP SCR STRK 2.0X14MM [id number] -  [id number] Metallic Hardware/[place] IMP SCR STRK 2.0X14MM [id number]  [employer] 42 02 02/29/24 Right 1 Wasted       Robert Jarrett DDS

## 2024-03-08 NOTE — ANESTHESIA PREPROCEDURE EVALUATION
"Anesthesia Pre-Procedure Evaluation    Patient: Amrik Whyte   MRN: [id number] : 2000        Procedure : Procedure(s):  Bilateral Sagittal Split Osteotomy          Past Medical History:   Diagnosis Date    Anxiety     Gastroesophageal reflux disease without esophagitis     Hyperlipidemia     Irritable bowel syndrome     Psoriasis     Uncomplicated asthma       History reviewed. No pertinent surgical history.   No Known Allergies   Social History     Tobacco Use    Smoking status: Never    Smokeless tobacco: Never   Substance Use Topics    Alcohol use: Yes     Comment: Drink with friends, about once a week      Wt Readings from Last 1 Encounters:   24 77.6 kg (171 lb)        Anesthesia Evaluation   Pt has had prior anesthetic.     No history of anesthetic complications       ROS/MED HX  ENT/Pulmonary:     (+)                      asthma                  Neurologic:  - neg neurologic ROS     Cardiovascular:  - neg cardiovascular ROS     METS/Exercise Tolerance:     Hematologic:       Musculoskeletal:       GI/Hepatic:     (+) GERD, Asymptomatic on medication,                  Renal/Genitourinary:  - neg Renal ROS     Endo:       Psychiatric/Substance Use:       Infectious Disease:       Malignancy:       Other:            Physical Exam    Airway        Mallampati: II   TM distance: > 3 FB   Neck ROM: full   Mouth opening: > 3 cm    Respiratory Devices and Support         Dental       (+) Completely normal teeth      Cardiovascular          Rhythm and rate: regular and normal     Pulmonary           breath sounds clear to auscultation           OUTSIDE LABS:  CBC: No results found for: \"WBC\", \"HGB\", \"HCT\", \"PLT\"  BMP: No results found for: \"NA\", \"POTASSIUM\", \"CHLORIDE\", \"CO2\", \"BUN\", \"CR\", \"GLC\"  COAGS: No results found for: \"PTT\", \"INR\", \"FIBR\"  POC: No results found for: \"BGM\", \"HCG\", \"HCGS\"  HEPATIC: No results found for: \"ALBUMIN\", \"PROTTOTAL\", \"ALT\", \"AST\", \"GGT\", \"ALKPHOS\", \"BILITOTAL\", " "\"BILIDIRECT\", \"BAYLEE\"  OTHER: No results found for: \"PH\", \"LACT\", \"A1C\", \"JOE\", \"PHOS\", \"MAG\", \"LIPASE\", \"AMYLASE\", \"TSH\", \"T4\", \"T3\", \"CRP\", \"SED\"    Anesthesia Plan    ASA Status:  2    NPO Status:  NPO Appropriate    Anesthesia Type: General.     - Airway: ETT         Techniques and Equipment:     - Airway: Video-Laryngoscope     - Lines/Monitors: 2nd IV     Consents    Anesthesia Plan(s) and associated risks, benefits, and realistic alternatives discussed. Questions answered and patient/representative(s) expressed understanding.     - Discussed: Risks, Benefits and Alternatives for BOTH SEDATION and the PROCEDURE were discussed     - Discussed with:  Patient            Postoperative Care    Pain management: Multi-modal analgesia.        Comments:    Other Comments: Nasal intubation  PIV x 2           Tahira Berrios MD    I have reviewed the pertinent notes and labs in the chart from the past 30 days and (re)examined the patient.  Any updates or changes from those notes are reflected in this note.                  "

## 2024-03-08 NOTE — ANESTHESIA PROCEDURE NOTES
Airway       Patient location during procedure: OR       Procedure Start/Stop Times: 3/8/2024 12:28 PM  Staff -        CRNA: Era Choi APRN CRNA       Performed By: CRNA  Consent for Airway        Urgency: elective  Indications and Patient Condition       Indications for airway management: karen-procedural         Mask difficulty assessment: 1 - vent by mask    Final Airway Details       Final airway type: endotracheal airway       Successful airway: MIKE and Nasal  Endotracheal Airway Details        ETT size (mm): 7.5       Cuffed: yes       Successful intubation technique: video laryngoscopy       VL Blade Size: Santos 3       Grade View of Cords: 1       Adjucts: stylet       Position: Left       Measured from: nares       Bite block used: None    Post intubation assessment        Placement verified by: capnometry, equal breath sounds and chest rise        Number of attempts at approach: 1       Secured with: tape       Ease of procedure: easy       Dentition: Intact and Unchanged    Medication(s) Administered   Medication Administration Time: 3/8/2024 12:28 PM

## 2024-03-08 NOTE — ANESTHESIA POSTPROCEDURE EVALUATION
Patient: Amrik Whyte    Procedure: Procedure(s):  Bilateral Sagittal Split Osteotomy       Anesthesia Type:  General    Note:  Disposition: Admission   Postop Pain Control: Uneventful            Sign Out: Well controlled pain   PONV: No   Neuro/Psych: Uneventful            Sign Out: Acceptable/Baseline neuro status   Airway/Respiratory: Uneventful            Sign Out: Acceptable/Baseline resp. status   CV/Hemodynamics: Uneventful            Sign Out: Acceptable CV status   Other NRE: NONE   DID A NON-ROUTINE EVENT OCCUR? No           Last vitals:  Vitals Value Taken Time   /84 03/08/24 1547   Temp     Pulse 85 03/08/24 1558   Resp 6 03/08/24 1558   SpO2 92 % 03/08/24 1558   Vitals shown include unfiled device data.    Electronically Signed By: Charles Farnsworth MD  March 8, 2024  4:19 PM

## 2024-03-09 VITALS
DIASTOLIC BLOOD PRESSURE: 77 MMHG | OXYGEN SATURATION: 93 % | RESPIRATION RATE: 16 BRPM | SYSTOLIC BLOOD PRESSURE: 138 MMHG | HEART RATE: 88 BPM | TEMPERATURE: 98.1 F | HEIGHT: 70 IN | BODY MASS INDEX: 24.48 KG/M2 | WEIGHT: 171 LBS

## 2024-03-09 LAB — GLUCOSE BLDC GLUCOMTR-MCNC: 135 MG/DL (ref 70–99)

## 2024-03-09 PROCEDURE — 250N000011 HC RX IP 250 OP 636: Performed by: DENTIST

## 2024-03-09 PROCEDURE — 250N000013 HC RX MED GY IP 250 OP 250 PS 637: Performed by: DENTIST

## 2024-03-09 PROCEDURE — 258N000003 HC RX IP 258 OP 636: Performed by: DENTIST

## 2024-03-09 RX ADMIN — HYDROCORTISONE: 10 CREAM TOPICAL at 08:10

## 2024-03-09 RX ADMIN — DEXAMETHASONE SODIUM PHOSPHATE 6 MG: 10 INJECTION INTRAMUSCULAR; INTRAVENOUS at 08:25

## 2024-03-09 RX ADMIN — ACETAMINOPHEN 975 MG: 325 TABLET, FILM COATED ORAL at 14:12

## 2024-03-09 RX ADMIN — HYDROCORTISONE: 10 CREAM TOPICAL at 14:19

## 2024-03-09 RX ADMIN — OXYCODONE HYDROCHLORIDE 10 MG: 5 TABLET ORAL at 11:06

## 2024-03-09 RX ADMIN — SODIUM CHLORIDE: 9 INJECTION, SOLUTION INTRAVENOUS at 08:18

## 2024-03-09 RX ADMIN — OXYCODONE HYDROCHLORIDE 10 MG: 5 TABLET ORAL at 06:37

## 2024-03-09 RX ADMIN — HYDROMORPHONE HYDROCHLORIDE 0.2 MG: 0.2 INJECTION, SOLUTION INTRAMUSCULAR; INTRAVENOUS; SUBCUTANEOUS at 02:13

## 2024-03-09 RX ADMIN — HYDROMORPHONE HYDROCHLORIDE 0.2 MG: 0.2 INJECTION, SOLUTION INTRAMUSCULAR; INTRAVENOUS; SUBCUTANEOUS at 04:53

## 2024-03-09 RX ADMIN — DEXAMETHASONE SODIUM PHOSPHATE 6 MG: 10 INJECTION INTRAMUSCULAR; INTRAVENOUS at 01:56

## 2024-03-09 RX ADMIN — ACETAMINOPHEN 975 MG: 325 TABLET, FILM COATED ORAL at 06:37

## 2024-03-09 RX ADMIN — AMPICILLIN SODIUM AND SULBACTAM SODIUM 1.5 G: 1; .5 INJECTION, POWDER, FOR SOLUTION INTRAMUSCULAR; INTRAVENOUS at 04:54

## 2024-03-09 RX ADMIN — AMPICILLIN SODIUM AND SULBACTAM SODIUM 1.5 G: 1; .5 INJECTION, POWDER, FOR SOLUTION INTRAMUSCULAR; INTRAVENOUS at 11:21

## 2024-03-09 RX ADMIN — OXYCODONE HYDROCHLORIDE 10 MG: 5 TABLET ORAL at 02:12

## 2024-03-09 ASSESSMENT — ACTIVITIES OF DAILY LIVING (ADL)
ADLS_ACUITY_SCORE: 27
ADLS_ACUITY_SCORE: 23
ADLS_ACUITY_SCORE: 27
ADLS_ACUITY_SCORE: 23
ADLS_ACUITY_SCORE: 27
ADLS_ACUITY_SCORE: 23

## 2024-03-09 NOTE — PROGRESS NOTES
Date & Time: 3/8/24 2228-2869  Surgery/POD#: 1 Bilateral Sagittal Split Osteotomy  Dx: Mandibular Hypoplasia  Behavior & Aggression: Green  Fall Risk: Yes  Orientation:AOx4  ABNL VS/O2: VSS x intermittent HTN and tachycardia  Weaned off Face Tent  ABNL Labs: n/a  Pain Management:dilaudid x3, oxy x2; zofran x1  Bowel/Bladder: Voiding adequately  Drains: L PIV Infusing NS @ 75 mL/hr w/ int abx;  Diet: Clears  Activity Level: Dangle, stood, and ambulated to bathroom.   Tests/Procedures: Nutrition Services Consult  Anticipated DC Date: Pending  Significant Information:   No Nose Blowing  Jaw Bra in Place

## 2024-03-09 NOTE — DISCHARGE SUMMARY
[facility] INPATIENT DISCHARGE SUMMARY    DATE OF ADMISSION: 3/8/24    DATE OF DISCHARGE: 3/9/24    ADMISSION DIAGNOSES:   1.  Mandibular hypoplasia  2.  Mandibular asymmetry     DISCHARGE DIAGNOSES:   1.  Mandibular hypoplasia  2.  Mandibular asymmetry    PROCEDURES PERFORMED DURING HOSPITALIZATION:   1.  Mandibular bilateral sagittal split ramus osteotomy      SURGEON:    Dr. Feliberto Branch DDS    SURGEON ASSISTANT:    Dr. Robert Jarrett DDS    HOSPITALIZATION COURSE:  Amrik Valentin is a 23 year-old man presenting with the abovementioned diagnoses.  The patient has been followed closely by the [facility].  After clinical evaluation, evaluation of models and imaging it was recommended that the patient undergo a mandibular bilateral sagittal split ramus osteotomy to help correct his skeletal and dental malocclusion.  After discussion of potential risks and benefits associated with the above-mentioned procedures, the patient consented to proceed.  The patient was concurrently [place] [facility] [place] their [place]odontist prior to surgery.    The patient presented to [facility] on 3/8/24 for the abovementioned procedure.  The patient underwent surgery without any complications under general anesthesia. The patient remained stable throught the intraoperative periode and was extubated in the operating room without complications. The patient was admitted postoperatively for routine postoperative observation, pain management, post operative bleeding monitoring and IV antibiotics.  On postop day #1, the patient was found to be recovering well from surgery.  The patient was lying comfortable in bed, alert and oriented.  The patient's pain was controlled overnight.  The patient was able to take adequate PO  liquid diet without nausea or vomiting, and was able void and ambulate without difficulty.   All vital signs remained stable throughout admission.  Given patient's progress, adequate pain control  and ability to take PO and void, it was determine that the patient could be discharged to home on POD #1  under the care of his mother.     DISCHARGE EXAMINATION:   GENERAL:  Patient was alert and oriented. Comfortable.  Answered questions appropriately.  Able to voice concerns appropriately.  Pleasant and cooperative.  Reported that pain was well controlled overnight at 3/10.    NEUROLOGIC:  Cranial nerve II through XII are grossly intact with bilateral cranial nerve V3 distribution paresthesia as expected from surgery.  The patient was alert and oriented x3, speaking in full sentences.  Denied any radiating pain or numbness.   HEENT:  Normocephalic, atraumatic.  The patient had facial and lip swelling consistent with surgical manipulation.  The patient reported bilateral cranial nerve V3 nerve distribution paresthesia as expected from surgery. Patient eyes: PERRL with EOMI. The patient denied diplopia or blurry vision.  Ears: external ears atraumatic with gross auditory function intact. Nose: No epistaxis. Dried blood present.  Intraorally, the surgical incision sites were closed with sutures intact, the mucosa  pink with good perfusion, and blanching to palpation with brisk reprofusion. Patient occlusion was stable and reproducible with inter arch elastics in place.  Floor of mouth was soft, nontender and nondistended.  Tongue was soft and freely mobile.  No dysphagia.  Trachea midline.  There is no cervical lymphadenopathy.   LUNGS:  Breathing room air without difficulty  CARDIOVASCULAR:  regular rate and rhythm  ABDOMEN:  soft, nondistended, nontender.    MUSCULOSKELETAL:  There is 5/5 strength in all 4 extremities.   SKIN:  No rashes, no hives.     DISCHARGE MEDICATIONS:   Previously given at out patient visit    DISCHARGE INSTRUCTIONS:   1.  Home oral cares:  Patient to brush teeth normally, rinse mouth with Peridex mouth rinse 2 times daily.  Also oral rinse with warm salt water 3-5 times daily.    2.  May  shower and wash face normally the day after surgery  3.  The patient to have strict, soft, nonchew diet: food that can be mashed with fingers, for example mashed potatoes, pudding, Jell-O, applesauce, yogurt.    4.  Nasal precautions:  The patient was instructed not to blow or sneeze, and to sneeze with mouth open if necessary for two weeks.  No sucking or drinking with straw.    5. To sleep with head of bed elevated the next 72 hours, instructed to used pillowed to elevated head to help with swelling.    6.  The patient can use jaw bra and ice in the next 72 hours to aid with swelling and pain, can use 10 minutes on and 10 minutes off while awake.     7. The patient was instructed not to lift any weight greater than 15-20 pounds for 2 weeks.    8.  No activities with face contact for the next 6 weeks.    9.  No driving when taking narcotic pain medication.   10. Call [facility] and ask for Oral Surgeon on call if there are any questions or concerns, or if the patient experiences increasing pain, swelling, bleeding or temperature/fever.     FOLLOWUP:  The patient was instructed to to return for postop followup appointment with Dr. Branch at the [facility] in 1 week.      Robert Jarrett DDS  [facility]  [address], [address]  [place] [address]  Clinic/On call [phone]  Clinic Fax [phone]

## 2024-03-09 NOTE — PLAN OF CARE
Goal Outcome Evaluation:     Date & Time: 3/9/24 3182-6622  Surgery/POD#: 1 Bilateral Sagittal Split Osteotomy  Dx: Mandibular Hypoplasia  Behavior & Aggression: Green  Fall Risk: Yes  Orientation: AOx4  ABNL VS/O2: VSS on RA  ABNL Labs: n/a  Pain Management: prn oxycodone x1 for jaw pain, jaw bra in place.  Bowel/Bladder: Voiding adequately in BR  Drains: PIVs removed   Diet: Clears  Activity Level: IND  Tests/Procedures: n/a  Anticipated DC Date: This afternoon, AVS printed and discharge instructions reviewed with pt and mom.   Significant Information: No Nose Blowing

## 2024-03-09 NOTE — CONSULTS
NUTRITION EDUCATION    REASON FOR ASSESSMENT:  Nutrition education on Jaw Surgery Diet    CURRENT DIET:  Clear Liquid Jaw Surgery Diet    NUTRITION HISTORY:  Deferred    NUTRITION DIAGNOSIS:  Food- and nutrition-related knowledge deficit R/t lack of prior exposure to information AEB recent jaw surgery.    INTERVENTIONS:  Nutrition Prescription:  Recommended adequate calories and protein to promote healing, several small meals per day, and use of high protein oral supplements.  Ordered Ensure Clear Mixed berry for Pt and took flavor preferences if diet advances to full liquids and Pt stays longer than today.   Allow PRN ordering of diet appropriate supplements    Implementation:  Assessed learning needs, learning preferences, and willingness to learn  Nutrition Education  (Content):  Provided handout  What to Eat After Jaw Surgery   Described diet progression per guidelines listed in handout  Discussed importance of small meals  Medical Food Supplements - recommended use of a high protein nutritional supplement  Anticipate good compliance  Diet Education - refer to Education Flowsheet    Goals:  Patient and mom verbalized understanding of diet by re-explaining the items he can have, mom confirming the [employer] supplements they already bought are OK, and discussing how to prepare homemade smoothies or other foods safely.   All of the above goals met during the education session    Follow Up:  Provided RD contact information for future questions    Obey Welch RD, LD

## 2024-03-18 ENCOUNTER — DOCUMENTATION ONLY (OUTPATIENT)
Dept: OTHER | Facility: CLINIC | Age: 24
End: 2024-03-18
Payer: COMMERCIAL

## 2024-03-19 ENCOUNTER — DOCUMENTATION ONLY (OUTPATIENT)
Dept: OTHER | Facility: CLINIC | Age: 24
End: 2024-03-19
Payer: COMMERCIAL

## (undated) DEVICE — Device

## (undated) DEVICE — SPONGE PACK VAGINAL 2X36"

## (undated) DEVICE — DECANTER VIAL 2006S

## (undated) DEVICE — LINEN TOWEL PACK X5 5464

## (undated) DEVICE — IMP SCR STRK 2.0X14MM 5020414: Type: IMPLANTABLE DEVICE | Site: MANDIBLE | Status: NON-FUNCTIONAL

## (undated) DEVICE — BUR STRK ROUND 2.0X54MM POLISHING 18 FLUTE 1608-006-159

## (undated) DEVICE — SU PLAIN FAST ABSORB 5-0 PC-1 18" 1915G

## (undated) DEVICE — SOL NACL 0.9% IRRIG 1000ML BOTTLE 2F7124

## (undated) DEVICE — DRILL BIT TWIST 2.0X1.6X5MM 60-16005

## (undated) DEVICE — DECANTER BAG 2002S

## (undated) DEVICE — DRILL BIT STRK LINDEMANN 2.2MM 1608-002-043

## (undated) DEVICE — VSP ORTHOGNATHIC BUNDLE VSPORTHOG

## (undated) DEVICE — DRILL TWIST STRK 1.6MM XLG 9216820

## (undated) DEVICE — GOWN LG DISP 9515

## (undated) DEVICE — BLADE SAW RECIP STRK PRECISION THIN 27X0.38MM 5100-137-233

## (undated) DEVICE — ESU NDL COLORADO MICRO 3CM STR N103A

## (undated) DEVICE — ESU GROUND PAD UNIVERSAL W/O CORD

## (undated) DEVICE — PACK HEAD NECK SEN15HNFSF

## (undated) DEVICE — SOL WATER IRRIG 1000ML BOTTLE 2F7114

## (undated) RX ORDER — PROPOFOL 10 MG/ML
INJECTION, EMULSION INTRAVENOUS
Status: DISPENSED
Start: 2024-03-08

## (undated) RX ORDER — BENZOCAINE/MENTHOL 6 MG-10 MG
LOZENGE MUCOUS MEMBRANE
Status: DISPENSED
Start: 2024-03-08

## (undated) RX ORDER — DEXMEDETOMIDINE HYDROCHLORIDE 4 UG/ML
INJECTION, SOLUTION INTRAVENOUS
Status: DISPENSED
Start: 2024-03-08

## (undated) RX ORDER — CHLORHEXIDINE GLUCONATE ORAL RINSE 1.2 MG/ML
SOLUTION DENTAL
Status: DISPENSED
Start: 2024-03-08

## (undated) RX ORDER — BUPIVACAINE HYDROCHLORIDE AND EPINEPHRINE 2.5; 5 MG/ML; UG/ML
INJECTION, SOLUTION EPIDURAL; INFILTRATION; INTRACAUDAL; PERINEURAL
Status: DISPENSED
Start: 2024-03-08

## (undated) RX ORDER — HYDROMORPHONE HYDROCHLORIDE 1 MG/ML
INJECTION, SOLUTION INTRAMUSCULAR; INTRAVENOUS; SUBCUTANEOUS
Status: DISPENSED
Start: 2024-03-08

## (undated) RX ORDER — METHYLPREDNISOLONE SODIUM SUCCINATE 125 MG/2ML
INJECTION, POWDER, LYOPHILIZED, FOR SOLUTION INTRAMUSCULAR; INTRAVENOUS
Status: DISPENSED
Start: 2024-03-08

## (undated) RX ORDER — FENTANYL CITRATE 50 UG/ML
INJECTION, SOLUTION INTRAMUSCULAR; INTRAVENOUS
Status: DISPENSED
Start: 2024-03-08